# Patient Record
Sex: MALE | Race: BLACK OR AFRICAN AMERICAN | NOT HISPANIC OR LATINO | Employment: FULL TIME | URBAN - METROPOLITAN AREA
[De-identification: names, ages, dates, MRNs, and addresses within clinical notes are randomized per-mention and may not be internally consistent; named-entity substitution may affect disease eponyms.]

---

## 2024-11-22 ENCOUNTER — HOSPITAL ENCOUNTER (OUTPATIENT)
Dept: RADIOLOGY | Facility: HOSPITAL | Age: 44
Discharge: HOME | End: 2024-11-22
Attending: PHYSICIAN ASSISTANT
Payer: COMMERCIAL

## 2024-11-22 ENCOUNTER — OCHSNER VIRTUAL EMERGENCY DEPARTMENT (OUTPATIENT)
Facility: CLINIC | Age: 44
End: 2024-11-22
Payer: COMMERCIAL

## 2024-11-22 ENCOUNTER — NURSE TRIAGE (OUTPATIENT)
Dept: ADMINISTRATIVE | Facility: CLINIC | Age: 44
End: 2024-11-22
Payer: COMMERCIAL

## 2024-11-22 ENCOUNTER — OFFICE VISIT (OUTPATIENT)
Dept: ORTHOPEDICS | Facility: CLINIC | Age: 44
End: 2024-11-22
Payer: COMMERCIAL

## 2024-11-22 VITALS — BODY MASS INDEX: 37 KG/M2 | WEIGHT: 249.81 LBS | HEIGHT: 69 IN

## 2024-11-22 DIAGNOSIS — S76.112A QUADRICEPS STRAIN, LEFT, INITIAL ENCOUNTER: ICD-10-CM

## 2024-11-22 DIAGNOSIS — R25.2 MUSCLE CRAMPING: ICD-10-CM

## 2024-11-22 DIAGNOSIS — M25.562 PAIN IN BOTH KNEES, UNSPECIFIED CHRONICITY: ICD-10-CM

## 2024-11-22 DIAGNOSIS — S86.899A: Primary | ICD-10-CM

## 2024-11-22 DIAGNOSIS — M25.561 PAIN IN BOTH KNEES, UNSPECIFIED CHRONICITY: ICD-10-CM

## 2024-11-22 PROCEDURE — 73562 X-RAY EXAM OF KNEE 3: CPT | Mod: TC,50

## 2024-11-22 PROCEDURE — 73562 X-RAY EXAM OF KNEE 3: CPT | Mod: 26,50,, | Performed by: RADIOLOGY

## 2024-11-22 PROCEDURE — 99999 PR PBB SHADOW E&M-EST. PATIENT-LVL III: CPT | Mod: PBBFAC,,, | Performed by: PHYSICIAN ASSISTANT

## 2024-11-22 RX ORDER — MELOXICAM 15 MG/1
15 TABLET ORAL DAILY
Qty: 30 TABLET | Refills: 1 | Status: SHIPPED | OUTPATIENT
Start: 2024-11-22

## 2024-11-22 NOTE — TELEPHONE ENCOUNTER
Pt states that he has stress fractures to bilateral knees and is currently being seen by PT. Pt now c/o legs cramping and pain to right thigh 4/10 for two weeks. Pt states that he was seen in UC 2 weeks ago for thigh pain and given steroid shot, NSAID and muscle relaxer pills. Pt does not have a PCP. States that PT is recommending that he is seen for an MRI and Xray. Denies chest pain and difficulty breathing. Advise to be seen in ED/UC  or PCP with approval. Secure chat sent to [facility] Rufus Tilley per protocol. Advised okay to be seen in office within 3 days. Appt scheduled for orthopedics for today per [facility] staff. Pt made aware. VU. Encounter routed to provider.     Reason for Disposition   Thigh or calf pain in only one leg and present > 1 hour    Additional Information   Negative: Looks like a broken bone or dislocated joint (e.g., crooked or deformed)   Negative: Sounds like a life-threatening emergency to the triager   Negative: Chest pain   Negative: Difficulty breathing   Negative: Entire foot is cool or blue in comparison to other side   Negative: Unable to walk   Negative: Fever and red area (or area very tender to touch)   Negative: Swollen joint and fever    Protocols used: Leg Pain-A-OH

## 2024-11-22 NOTE — PROGRESS NOTES
SUBJECTIVE:     Chief Complaint & History of Present Illness:  Joe Culver is a New patient 43 y.o. male who is seen here today with a complaint of    Chief Complaint   Patient presents with    Right Knee - Pain    Left Knee - Pain    .  Patient is here today for evaluation treatment bilateral shin bilateral knees and right thigh pain for the past several months.  States he had suffered a upper respiratory infection while out of the country in [place] was subsequently hospitalized received large doses of steroids.  Upon release he began and attempted rehab programs with strength in his lower extremities and regain his functional capacities.  Through the course of this he developed pain and tenderness in the anterior calves of bilateral lower extremities was seen by an outside facility x-rays and MRI at that time demonstrated multiple stress fractures in the area.  Has tried to decrease his activity levels but is still running on a treadmill on a regular basis at that time.  Begun to develop soreness and pain in the knees from which she saw a different provider placed him on muscle relaxers and NSAIDs with limited relief.  States he did have some alteration in his gait secondary to the knee pain which has resulted in soreness and pain in the mid quadriceps region of the right leg.  States he does occasionally have cramping even met rest.  For in depth questioning patient's diet has been very poor in new treatments in minerals this could be a cramping trigger.  Patient works at a [employer] and must ambulate up to 7-8 miles per day including climbing multiple ladders this is exacerbating his condition  On a scale of 1-10, with 10 being worst pain imaginable, he rates this pain as 4 on good days and 8 on bad days.  he describes the pain as sore and ankle.    Review of patient's allergies indicates:  No Known Allergies      No current outpatient medications on file.     No current facility-administered medications for  "this visit.       No past medical history on file.    No past surgical history on file.    Vital Signs (Most Recent)  There were no vitals filed for this visit.        Review of Systems:  ROS:  Constitutional: no fever or chills  Eyes: no visual changes  ENT: no nasal congestion or sore throat  Respiratory: no cough or shortness of breath  Cardiovascular: no chest pain or palpitations  Gastrointestinal: no nausea or vomiting, tolerating diet  Genitourinary: no hematuria or dysuria  Integument/Breast: no rash or pruritis  Hematologic/Lymphatic: no easy bruising or lymphadenopathy  Musculoskeletal: no arthralgias or myalgias  Neurological: no seizures or tremors  Behavioral/Psych: no auditory or visual hallucinations  Endocrine: no heat or cold intolerance                OBJECTIVE:     PHYSICAL EXAM:  Height: 5' 9" (175.3 cm) Weight: 113.3 kg (249 lb 12.5 oz), General Appearance: Well nourished, well developed, in no acute distress.  Neurological: Mood & affect are normal.    left  Knee Exam:  Knee Range of Motion:0-125 degrees flexion   Effusion:none  Condition of skin:intact  Location of tenderness:superior anterior tibia   Strength:5 of 5  Stability:  Lachman: stable, LCL: stable, MCL: stable, PCL: stable, and posteromedial (dial): stable  Varus /Valgus stress:  normal  [place]:   negative/negative    right  Knee Exam:  Knee Range of Motion:0-125 degrees flexion   Effusion:none  Condition of skin:intact  Location of tenderness:  Superior anterior tibia and medial quadriceps body   Strength:limited by pain and 5 of 5  Stability:  Lachman: stable, LCL: stable, MCL: stable, PCL: stable, and posteromedial (dial): stable  Varus /Valgus stress:  normal  [place]:   negative/negative      Hip Examination:  positives: pain with flexion and negatives: FROM    RADIOGRAPHS:  X-rays of the knees taken today films reviewed by me demonstrate well-preserved joint spaces throughout both knees with no evidence of fracture " dislocation or bony abnormalities no marked joint loss in either compartment    ASSESSMENT/PLAN:       ICD-10-CM ICD-9-CM   1. Posterior shin splints, initial encounter  S86.899A 844.9   2. Pain in both knees, unspecified chronicity  M25.561 719.46    M25.562    3. Muscle cramping  R25.2 729.82   4. Quadriceps strain, left, initial encounter  S76.112A 843.8       Plan: We discussed with the patient at length all the different treatment options available for  the knee including anti-inflammatories, acetaminophen, rest, ice, knee strengthening exercise, occasional cortisone injections for temporary relief, Viscosupplimentation injections, arthroscopic debridement osteotomy, and finally knee arthroplasty.   Meloxicam 15 mg q.d. with food x2 weeks followed by p.r.n.  Consult to Physical therapy for sports training and range of motion exercises  Follow-up in 3 weeks if symptoms not significantly improved sooner symptoms dictate

## 2024-11-22 NOTE — PLAN OF CARE-OVED
[facility] (formerly [facility]3] Emergency Department Plan of Care Note    Referral source: Nurse On-Call      Reason for consult: Joint Pain(s)      Additional History: Good morning, Pt states that he has stress fractures to bilateral knees and is currently being seen by PT. Pt now c/o legs cramping and pain to right thigh 4/10 for two weeks. Pt states that he was seen in  2 weeks ago for thigh pain and given steroid shot, NSAID and muscle relaxer pills. Pt does not have a PCP. States that PT is recommending that he is seen for an MRI and Xray.       Disposition recommended: Specialist Referral:  Ortho      Patient navigator was able to obtain a appointment with Orthopedics this afternoon

## 2024-11-26 ENCOUNTER — CLINICAL SUPPORT (OUTPATIENT)
Dept: REHABILITATION | Facility: HOSPITAL | Age: 44
End: 2024-11-26
Attending: PHYSICIAN ASSISTANT
Payer: COMMERCIAL

## 2024-11-26 DIAGNOSIS — S86.899A: ICD-10-CM

## 2024-11-26 DIAGNOSIS — M25.562 PAIN IN BOTH KNEES, UNSPECIFIED CHRONICITY: ICD-10-CM

## 2024-11-26 DIAGNOSIS — M25.562 CHRONIC PAIN OF BOTH KNEES: Primary | ICD-10-CM

## 2024-11-26 DIAGNOSIS — M25.561 CHRONIC PAIN OF BOTH KNEES: Primary | ICD-10-CM

## 2024-11-26 DIAGNOSIS — M25.561 PAIN IN BOTH KNEES, UNSPECIFIED CHRONICITY: ICD-10-CM

## 2024-11-26 DIAGNOSIS — R25.2 MUSCLE CRAMPING: ICD-10-CM

## 2024-11-26 DIAGNOSIS — G89.29 CHRONIC PAIN OF BOTH KNEES: Primary | ICD-10-CM

## 2024-11-26 DIAGNOSIS — R26.9 GAIT ABNORMALITY: ICD-10-CM

## 2024-11-26 PROCEDURE — 97161 PT EVAL LOW COMPLEX 20 MIN: CPT | Performed by: PHYSICAL THERAPIST

## 2024-11-26 PROCEDURE — 97110 THERAPEUTIC EXERCISES: CPT | Performed by: PHYSICAL THERAPIST

## 2024-11-26 NOTE — PROGRESS NOTES
[facility]  Physical Therapy Initial Evaluation    Name: Joe Culver  Clinic Number: [id number]    Therapy Diagnosis:   Encounter Diagnoses   Name Primary?    Pain in both knees, unspecified chronicity     Posterior shin splints, initial encounter     Muscle cramping     Chronic pain of both knees Yes    Gait abnormality      Physician: Luther Mcgee PA*    Physician Orders: PT Eval and Treat  Medical Diagnosis from Referral:   M25.561,M25.562 (ICD-10-CM) - Pain in both knees, unspecified chronicity   S86.899A (ICD-10-CM) - Posterior shin splints, initial encounter   R25.2 (ICD-10-CM) - Muscle cramping   Evaluation Date: 11/26/2024  Authorization Period Expiration: 11/22/2025  Plan of Care Expiration: 02/26/2025  Visit # / Visits authorized: 1/1    FOTO: 34  FOTO 1st Follow-up: NA  FOTO 2nd Follow-up: NA    Time In: 1600  Time Out: 1705  Total Billable Time: 65 minutes    Precautions: Standard and Hx of Stress B tibial crest    Subjective     Date of onset: March 2024  History of current condition - Palomo reports: aching pain near B patellar T that presents with prolonged walking/wb'ing. Issues with loaded knee flexion and navigating stairs. Unable to relate any true recent PIERRE, but describes that he attempted to go to [facility] 5-6 days/week January-March and ended up developing stress fractures in his knees. Also describes that he returned to work in June after a period of rest, and also attempted PT in June.  Denies any mechanical sx behavior. Denies any giving way.    MD/PA Hx 11/22/2024:  States he had suffered a upper respiratory infection while out of the country in March was subsequently hospitalized received large doses of steroids. Upon release he began and attempted rehab programs with strength in his lower extremities and regain his functional capacities. Through the course of this he developed pain and tenderness in the anterior calves of bilateral lower extremities was  "seen by an outside facility x-rays and MRI at that time demonstrated multiple stress fractures in the area. Has tried to decrease his activity levels but is still running on a treadmill on a regular basis at that time. Begun to develop soreness and pain in the knees from which she saw a different provider placed him on muscle relaxers and NSAIDs with limited relief. States he did have some alteration in his gait secondary to the knee pain which has resulted in soreness and pain in the mid quadriceps region of the right leg. States he does occasionally have cramping even met rest. For in depth questioning patient's diet has been very poor in new treatments in minerals this could be a cramping trigger. 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    Surgical History:   Joe Culver  has no past surgical history on file.    Medications:   Joe has a current medication list which includes the following prescription(s): meloxicam.    Allergies:   Review of patient's allergies indicates:  No Known Allergies     Objective     Functional Tests:   SLS EO:   R: 8" L: 10"  Double leg squat: decreased depth; ANT chain dominant  Single leg squat: unable B  Single leg calf raise:   R: 10 L: 8  30" " STS: 6 reps    ROM  (Hyper - Ext - Flex) Right Left   Passive 0 - 0 - 115 0 - 0 - 120   Active  0 - 0 - 95* 0 - 0 - 100*   * = Pain    Lower Extremity Strength   Right LE Left LE   Quadriceps: 3+/5 3+/5   Hamstrings: 4/5 4/5   Hip flexion (supine): 3+/5 3+/5   Hip extension:  3+/5 3+/5   PGM: 3+/5 3+/5     Joint Mobility: hypo B patella     Palpation: TTP B patellar T    Flexibility:    Hamstrings: R = tigh ; L = tight    Emil test: R = tight ; L = tight    Intake Outcome Measure for FOTO Knee Survey    Therapist reviewed FOTO scores for Joe Culver on 11/26/2024.   FOTO documents entered into [employer] - see Media section.    Intake Score: 34%     Treatment     Treatment Time In: 1600  Treatment Time Out: 1705  Total Treatment time separate from Evaluation: 40 minutes    Palomo received the treatments listed below:      Therapeutic exercises to develop strength, endurance, ROM, flexibility, posture, and core stabilization for 30 minutes including:  Access Code: [email or web address]  URL: [email or web address]  Date: 11/26/2024  Prepared by: Maksim Concepcion    Exercises  - Supine Figure 4 Piriformis Stretch  - 1 x daily - 7 x weekly - 3 sets - 10 reps  - Supine Hamstring Stretch  - 1 x daily - 7 x weekly - 3 sets - 10 reps  - Side Stepping with Resistance at Thighs  - 1 x daily - 7 x weekly - 3 sets - 10 reps  - Standing Hip Abduction with Resistance at Ankles and Counter Support  - 1 x daily - 7 x weekly - 3 sets - 10 reps  - Standing Hip Extension with Resistance at Ankles and Counter Support  - 1 x daily - 7 x weekly - 3 sets - 10 reps    Neuromuscular re-education activities to improve: Balance, Coordination, Kinesthetic, Sense, Proprioception, and Posture for 0 minutes. The following activities were included:     Therapeutic activities to improve functional performance for 0 minutes, including:    Home Exercises and Patient Education Provided     Education provided:   - Hip mobility and stability  - Prognosis, activity  modification, goals for therapy, role of therapy for care, exercises/HEP    Written Home Exercises Provided:  Exercises were reviewed and Palomo was able to demonstrate them prior to the end of the session.   Pt received a written copy of exercises to perform at home. Palomo demonstrated good understanding of the education provided.     See EMR under patient instructions for exercises given.     Assessment     Joe is a 43 y.o. male referred to outpatient Physical Therapy with B Patellar Tendinopathy; Hx of stress fractures to B knees. Current deficits include decreased HS and overall hip mobility, weakness B hips, and stiffness B knees. Deficits contributing to issues with prolonged standing, walking, squatting, and ADL performance.    Pt will benefit from skilled outpatient Physical Therapy to address the deficits stated above and in the chart below, provide pt/family education, and to maximize pt's level of independence. Pt prognosis is Good.     Plan of care discussed with patient: Yes  Pt's spiritual, cultural and educational needs considered and patient is agreeable to the plan of care and goals as stated below:     Anticipated Barriers for therapy: work demands; Hx of stress fractures    Medical Necessity is demonstrated by the following:    History  Co-morbidities and personal factors that may impact the plan of care [x] LOW: no personal factors / co-morbidities  [] MODERATE: 1-2 personal factors / co-morbidities  [] HIGH: 3+ personal factors / co-morbidities    Moderate / High Support Documentation:   Co-morbidities affecting plan of care: None    Personal Factors:   No deficits     Examination  Body Structures and Functions, activity limitations and participation restrictions that may impact the plan of care [x] LOW: addressing 1-2 elements  [] MODERATE: 3+ elements  [] HIGH: 4+ elements (please support below)    Moderate / High Support Documentation: None     Clinical Presentation [x] LOW: stable  []  MODERATE: Evolving  [] HIGH: Unstable     Decision Making/ Complexity Score: low       GOALS   Short Term Goals: 6 weeks  Pt will report decreased B knee pain  < / =  4/10  to increase tolerance for ADL performance and recreational routine.  Pt will improve B knee ROM to WFL in order to be able to perform ADLs without difficulty.  Pt will improve B knee strength by 1/3 MMT grade to increase tolerance for ADL and work activities  Pt will demonstrate tolerance to HEP to improve independence with ADL's    Long Term Goals: 12 weeks  Pt will report decreased B knee pain  < / =  2/10  to increase tolerance for ADL performance and recreational routine  Pt will improve B knee ROM to WNL in order to be able to perform ADLs without difficulty  Pt will improve B knee strength by 1/3 MMT grade to increase tolerance for ADL and work activities  Pt will report at CJ level (20-40% impaired) on FOTO Knee to demonstrate increase in LE function with every day tasks.     Plan   Plan of care Certification: 11/26/2024 to 02/26/2025.    Outpatient Physical Therapy 2 times weekly for 12 weeks to include the following interventions: Gait Training, Manual Therapy, Moist Heat/ Ice, Neuromuscular Re-ed, Patient Education, Therapeutic Activites, Therapeutic Exercise, and Functional Dry Needling with/or without Electrical Stimulation as needed.     Sharath Concepcion, PT, DPT , DPT, OCS  Board Certified in Orthopedic Physical Therapy

## 2024-11-29 PROBLEM — R26.9 GAIT ABNORMALITY: Status: ACTIVE | Noted: 2024-11-29

## 2024-11-29 NOTE — PLAN OF CARE
[facility]  Physical Therapy Initial Evaluation    Name: Joe Culver  Clinic Number: [id number]    Therapy Diagnosis:   Encounter Diagnoses   Name Primary?    Pain in both knees, unspecified chronicity     Posterior shin splints, initial encounter     Muscle cramping     Chronic pain of both knees Yes    Gait abnormality      Physician: Luther Mcgee PA*    Physician Orders: PT Eval and Treat  Medical Diagnosis from Referral:   M25.561,M25.562 (ICD-10-CM) - Pain in both knees, unspecified chronicity   S86.899A (ICD-10-CM) - Posterior shin splints, initial encounter   R25.2 (ICD-10-CM) - Muscle cramping   Evaluation Date: 11/26/2024  Authorization Period Expiration: 11/22/2025  Plan of Care Expiration: 02/26/2025  Visit # / Visits authorized: 1/1    FOTO: 34  FOTO 1st Follow-up: NA  FOTO 2nd Follow-up: NA    Time In: 1600  Time Out: 1705  Total Billable Time: 65 minutes    Precautions: Standard and Hx of Stress B tibial crest    Subjective     Date of onset: March 2024  History of current condition - Palomo reports: aching pain near B patellar T that presents with prolonged walking/wb'ing. Issues with loaded knee flexion and navigating stairs. Unable to relate any true recent PIERRE, but describes that he attempted to go to [facility] 5-6 days/week January-March and ended up developing stress fractures in his knees. Also describes that he returned to work in June after a period of rest, and also attempted PT in June.  Denies any mechanical sx behavior. Denies any giving way.    MD/PA Hx 11/22/2024:  States he had suffered a upper respiratory infection while [place] of the country in [place] was subsequently hospitalized received large doses of steroids. Upon release he began and attempted rehab programs with strength in his lower extremities and regain his functional capacities. Through the course of this he developed pain and tenderness in the anterior calves of bilateral lower extremities was  "seen by an outside facility x-rays and MRI at that time demonstrated multiple stress fractures in the area. Has tried to decrease his activity levels but is still running on a treadmill on a regular basis at that time. Begun to develop soreness and pain in the knees from which she saw a different provider placed him on muscle relaxers and NSAIDs with limited relief. States he did have some alteration in his gait secondary to the knee pain which has resulted in soreness and pain in the mid quadriceps region of the right leg. States he does occasionally have cramping even met rest. For in depth questioning patient's diet has been very poor in new treatments in minerals this could be a cramping trigger. 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    Surgical History:   Joe Culver  has no past surgical history on file.    Medications:   Joe has a current medication list which includes the following prescription(s): meloxicam.    Allergies:   Review of patient's allergies indicates:  No Known Allergies     Objective     Functional Tests:   SLS EO:   R: 8" L: 10"  Double leg squat: decreased depth; ANT chain dominant  Single leg squat: unable B  Single leg calf raise:   R: 10 L: 8  30" " STS: 6 reps    ROM  (Hyper - Ext - Flex) Right Left   Passive 0 - 0 - 115 0 - 0 - 120   Active  0 - 0 - 95* 0 - 0 - 100*   * = Pain    Lower Extremity Strength   Right LE Left LE   Quadriceps: 3+/5 3+/5   Hamstrings: 4/5 4/5   Hip flexion (supine): 3+/5 3+/5   Hip extension:  3+/5 3+/5   PGM: 3+/5 3+/5     Joint Mobility: hypo B patella     Palpation: TTP B patellar T    Flexibility:    Hamstrings: R = tigh ; L = tight    Emil test: R = tight ; L = tight    Intake Outcome Measure for FOTO Knee Survey    Therapist reviewed FOTO scores for Joe Culver on 11/26/2024.   FOTO documents entered into [employer] - see Media section.    Intake Score: 34%     Treatment     Treatment Time In: 1600  Treatment Time Out: 1705  Total Treatment time separate from Evaluation: 40 minutes    Palomo received the treatments listed below:      Therapeutic exercises to develop strength, endurance, ROM, flexibility, posture, and core stabilization for 30 minutes including:  Access Code: [email or web address]  URL: [email or web address]  Date: 11/26/2024  Prepared by: Maksim Concepcion    Exercises  - Supine Figure 4 Piriformis Stretch  - 1 x daily - 7 x weekly - 3 sets - 10 reps  - Supine Hamstring Stretch  - 1 x daily - 7 x weekly - 3 sets - 10 reps  - Side Stepping with Resistance at Thighs  - 1 x daily - 7 x weekly - 3 sets - 10 reps  - Standing Hip Abduction with Resistance at Ankles and Counter Support  - 1 x daily - 7 x weekly - 3 sets - 10 reps  - Standing Hip Extension with Resistance at Ankles and Counter Support  - 1 x daily - 7 x weekly - 3 sets - 10 reps    Neuromuscular re-education activities to improve: Balance, Coordination, Kinesthetic, Sense, Proprioception, and Posture for 0 minutes. The following activities were included:     Therapeutic activities to improve functional performance for 0 minutes, including:    Home Exercises and Patient Education Provided     Education provided:   - Hip mobility and stability  - Prognosis, activity  modification, goals for therapy, role of therapy for care, exercises/HEP    Written Home Exercises Provided:  Exercises were reviewed and Palomo was able to demonstrate them prior to the end of the session.   Pt received a written copy of exercises to perform at home. Palomo demonstrated good understanding of the education provided.     See EMR under patient instructions for exercises given.     Assessment     Joe is a 43 y.o. male referred to outpatient Physical Therapy with B Patellar Tendinopathy; Hx of stress fractures to B knees. Current deficits include decreased HS and overall hip mobility, weakness B hips, and stiffness B knees. Deficits contributing to issues with prolonged standing, walking, squatting, and ADL performance.    Pt will benefit from skilled outpatient Physical Therapy to address the deficits stated above and in the chart below, provide pt/family education, and to maximize pt's level of independence. Pt prognosis is Good.     Plan of care discussed with patient: Yes  Pt's spiritual, cultural and educational needs considered and patient is agreeable to the plan of care and goals as stated below:     Anticipated Barriers for therapy: work demands; Hx of stress fractures    Medical Necessity is demonstrated by the following:    History  Co-morbidities and personal factors that may impact the plan of care [x] LOW: no personal factors / co-morbidities  [] MODERATE: 1-2 personal factors / co-morbidities  [] HIGH: 3+ personal factors / co-morbidities    Moderate / High Support Documentation:   Co-morbidities affecting plan of care: None    Personal Factors:   No deficits     Examination  Body Structures and Functions, activity limitations and participation restrictions that may impact the plan of care [x] LOW: addressing 1-2 elements  [] MODERATE: 3+ elements  [] HIGH: 4+ elements (please support below)    Moderate / High Support Documentation: None     Clinical Presentation [x] LOW: stable  []  MODERATE: Evolving  [] HIGH: Unstable     Decision Making/ Complexity Score: low       GOALS   Short Term Goals: 6 weeks  Pt will report decreased B knee pain  < / =  4/10  to increase tolerance for ADL performance and recreational routine.  Pt will improve B knee ROM to WFL in order to be able to perform ADLs without difficulty.  Pt will improve B knee strength by 1/3 MMT grade to increase tolerance for ADL and work activities  Pt will demonstrate tolerance to HEP to improve independence with ADL's    Long Term Goals: 12 weeks  Pt will report decreased B knee pain  < / =  2/10  to increase tolerance for ADL performance and recreational routine  Pt will improve B knee ROM to WNL in order to be able to perform ADLs without difficulty  Pt will improve B knee strength by 1/3 MMT grade to increase tolerance for ADL and work activities  Pt will report at CJ level (20-40% impaired) on FOTO Knee to demonstrate increase in LE function with every day tasks.     Plan   Plan of care Certification: 11/26/2024 to 02/26/2025.    Outpatient Physical Therapy 2 times weekly for 12 weeks to include the following interventions: Gait Training, Manual Therapy, Moist Heat/ Ice, Neuromuscular Re-ed, Patient Education, Therapeutic Activites, Therapeutic Exercise, and Functional Dry Needling with/or without Electrical Stimulation as needed.     Sharath Concepcion, PT, DPT , DPT, OCS  Board Certified in Orthopedic Physical Therapy

## 2024-12-03 ENCOUNTER — CLINICAL SUPPORT (OUTPATIENT)
Dept: REHABILITATION | Facility: HOSPITAL | Age: 44
End: 2024-12-03
Payer: COMMERCIAL

## 2024-12-03 DIAGNOSIS — R26.9 GAIT ABNORMALITY: Primary | ICD-10-CM

## 2024-12-03 PROCEDURE — 97530 THERAPEUTIC ACTIVITIES: CPT | Performed by: PHYSICAL THERAPIST

## 2024-12-03 PROCEDURE — 97112 NEUROMUSCULAR REEDUCATION: CPT | Performed by: PHYSICAL THERAPIST

## 2024-12-05 NOTE — PROGRESS NOTES
Physical Therapy Daily Treatment Note     Name: Joe Culver  Clinic Number: [id number]    Therapy Diagnosis:   Encounter Diagnosis   Name Primary?    Gait abnormality Yes     Physician: Luther Mcgee PA*    Visit Date: 12/3/2024    Physician Orders: PT Eval and Treat  Medical Diagnosis from Referral:   M25.561,M25.562 (ICD-10-CM) - Pain in both knees, unspecified chronicity   S86.899A (ICD-10-CM) - Posterior shin splints, initial encounter   R25.2 (ICD-10-CM) - Muscle cramping   Evaluation Date: 11/26/2024  Authorization Period Expiration: 11/22/2025  Plan of Care Expiration: 02/26/2025  Visit # / Visits authorized: 1/10 (+ EVAL)     FOTO: 34  FOTO 1st Follow-up: NA  FOTO 2nd Follow-up: NA     Time In: 1610  Time Out: 1710  Total Billable Time: 60 minutes     Precautions: Standard and Hx of Stress B tibial crest    Subjective     Pt reports mild soreness after last session, but no adverse pain.  He was compliant with home exercise program.  Response to previous treatment: Good  Functional change: Improved walking tolerance    Pain: 3/10  Location: bilateral knee      Objective     Objective Measures updated at progress report unless specified    Treatment     PT Tech Extender utilized under supervision throughout tx session  Access Code: [email or web address]  Palomo received the treatments listed below:       Therapeutic exercises to develop strength, endurance, ROM, flexibility, posture, and core stabilization for 0 minutes including:  NP:  - Supine Figure 4 Piriformis Stretch  - 1 x daily - 7 x weekly - 3 sets - 10 reps  - Supine Hamstring Stretch  - 1 x daily - 7 x weekly - 3 sets - 10 reps  - Side Stepping with Resistance at Thighs  - 1 x daily - 7 x weekly - 3 sets - 10 reps  - Standing Hip Abduction with Resistance at Ankles and Counter Support  - 1 x daily - 7 x weekly - 3 sets - 10 reps  - Standing Hip Extension with Resistance at Ankles and Counter Support  - 1 x daily - 7 x weekly - 3 sets - 10 reps     Neuromuscular  re-education activities to improve: Balance, Coordination, Kinesthetic, Sense, Proprioception, and Posture for 30 minutes. The following activities were included:   Bridging GTB 3 x 15  Fire Hydrants GTB 3 x 15  SL Clams GTB 3 x 15     Therapeutic activities to improve functional performance for 30 minutes, including:  Bike completed for 10' to increase ROM, endurance, and decrease pain to improve tolerance to ADLs and age-related activities  Monster Walks GTB x 10 laps  Standing Hip Diagonals GTB 3 x 15     Home Exercises and Patient Education Provided      Education provided:   - Hip mobility and stability  - Prognosis, activity modification, goals for therapy, role of therapy for care, exercises/HEP     Written Home Exercises Provided:  Exercises were reviewed and Palomo was able to demonstrate them prior to the end of the session.   Pt received a written copy of exercises to perform at home. Palomo demonstrated good understanding of the education provided.      See EMR under patient instructions for exercises given.     Assessment     First return since original session. Able to progress CKC interventions to include more hip strengthening without being limited by knee sx behavior.  Palomo Is progressing well towards his goals.   Pt prognosis is Good.     Pt will continue to benefit from skilled outpatient physical therapy to address the deficits listed in the problem list box on initial evaluation, provide pt/family education and to maximize pt's level of independence in the home and community environment.     Pt's spiritual, cultural and educational needs considered and pt agreeable to plan of care and goals.    Anticipated barriers to physical therapy: work demands; Hx of stress fractures     GOALS   Short Term Goals: 6 weeks  Pt will report decreased B knee pain  < / =  4/10  to increase tolerance for ADL performance and recreational routine.  Pt will improve B knee ROM to WFL in order to be able to perform ADLs  without difficulty.  Pt will improve B knee strength by 1/3 MMT grade to increase tolerance for ADL and work activities  Pt will demonstrate tolerance to HEP to improve independence with ADL's     Long Term Goals: 12 weeks  Pt will report decreased B knee pain  < / =  2/10  to increase tolerance for ADL performance and recreational routine  Pt will improve B knee ROM to WNL in order to be able to perform ADLs without difficulty  Pt will improve B knee strength by 1/3 MMT grade to increase tolerance for ADL and work activities  Pt will report at CJ level (20-40% impaired) on FOTO Knee to demonstrate increase in LE function with every day tasks.     Plan     Continue per ANN Concepcion, PT, DPT, DPT  Board Certified in Orthopedic Physical Therapy

## 2024-12-10 ENCOUNTER — CLINICAL SUPPORT (OUTPATIENT)
Dept: REHABILITATION | Facility: HOSPITAL | Age: 44
End: 2024-12-10
Attending: PHYSICAL THERAPIST
Payer: COMMERCIAL

## 2024-12-10 DIAGNOSIS — R26.9 GAIT ABNORMALITY: Primary | ICD-10-CM

## 2024-12-10 PROCEDURE — 97530 THERAPEUTIC ACTIVITIES: CPT | Performed by: PHYSICAL THERAPIST

## 2024-12-10 PROCEDURE — 97112 NEUROMUSCULAR REEDUCATION: CPT | Performed by: PHYSICAL THERAPIST

## 2024-12-13 ENCOUNTER — TELEPHONE (OUTPATIENT)
Dept: ORTHOPEDICS | Facility: CLINIC | Age: 44
End: 2024-12-13
Payer: COMMERCIAL

## 2024-12-13 NOTE — TELEPHONE ENCOUNTER
----- Message from Danilo sent at 12/13/2024  1:00 PM CST -----  Regarding: Appt  Contact: pt [phone]  Pt is returning call to discuss appt for today, pt was informed provider would try and fit in schedule, please call pt @[phone]

## 2024-12-13 NOTE — TELEPHONE ENCOUNTER
----- Message from Melanie sent at 12/13/2024  9:34 AM CST -----  Regarding: Reschedule  Contact: pt [phone]  Type:  Needs Medical Advice    Who Called: Joe calling because he has caught a flat and calling to see if he can get a later appt     Would the patient rather a call back or a response via [facility]? Call back  Best Call Back Number: pt [phone]   Additional Information:

## 2024-12-15 NOTE — PROGRESS NOTES
Physical Therapy Daily Treatment Note     Name: Joe Culver  Clinic Number: [id number]    Therapy Diagnosis:   Encounter Diagnosis   Name Primary?    Gait abnormality Yes     Physician: Luther Mcgee PA*    Visit Date: 12/10/2024    Physician Orders: PT Eval and Treat  Medical Diagnosis from Referral:   M25.561,M25.562 (ICD-10-CM) - Pain in both knees, unspecified chronicity   S86.899A (ICD-10-CM) - Posterior shin splints, initial encounter   R25.2 (ICD-10-CM) - Muscle cramping   Evaluation Date: 11/26/2024  Authorization Period Expiration: 11/22/2025  Plan of Care Expiration: 02/26/2025  Visit # / Visits authorized: 2/10 (+ EVAL)     FOTO: 34  FOTO 1st Follow-up: NA  FOTO 2nd Follow-up: NA     Time In: 1510  Time Out: 1610  Total Billable Time: 60 minutes     Precautions: Standard and Hx of Stress B tibial crest    Subjective     Pt reports knees feel around the same, but no increased pain.  He was compliant with home exercise program.  Response to previous treatment: Good  Functional change: Improved walking tolerance    Pain: 3/10  Location: bilateral knee      Objective     Objective Measures updated at progress report unless specified    Treatment     PT Tech Extender utilized under supervision throughout tx session  Access Code: [email or web address]  Palomo received the treatments listed below:       Therapeutic exercises to develop strength, endurance, ROM, flexibility, posture, and core stabilization for 0 minutes including:  NP:  - Supine Figure 4 Piriformis Stretch  - 1 x daily - 7 x weekly - 3 sets - 10 reps  - Supine Hamstring Stretch  - 1 x daily - 7 x weekly - 3 sets - 10 reps  - Side Stepping with Resistance at Thighs  - 1 x daily - 7 x weekly - 3 sets - 10 reps  - Standing Hip Abduction with Resistance at Ankles and Counter Support  - 1 x daily - 7 x weekly - 3 sets - 10 reps  - Standing Hip Extension with Resistance at Ankles and Counter Support  - 1 x daily - 7 x weekly - 3 sets - 10 reps     Neuromuscular  re-education activities to improve: Balance, Coordination, Kinesthetic, Sense, Proprioception, and Posture for 30 minutes. The following activities were included:   Bridging GTB 3 x 15  Fire Hydrants GTB 3 x 15  SL Clams GTB 3 x 15     Therapeutic activities to improve functional performance for 30 minutes, including:  Bike completed for 10' to increase ROM, endurance, and decrease pain to improve tolerance to ADLs and age-related activities  Monster Walks GTB x 10 laps  Standing Hip Diagonals GTB 3 x 15     Home Exercises and Patient Education Provided      Education provided:   - Hip mobility and stability  - Prognosis, activity modification, goals for therapy, role of therapy for care, exercises/HEP     Written Home Exercises Provided:  Exercises were reviewed and Palomo was able to demonstrate them prior to the end of the session.   Pt received a written copy of exercises to perform at home. Palomo demonstrated good understanding of the education provided.      See EMR under patient instructions for exercises given.     Assessment     Continued to work in more proximal hip strengthening in CKC demands. Pt inquired about transitioning to performance training for future care to work on strength and loading. Will acquire contact information and send to pt through the portal.  Palomo Is progressing well towards his goals.   Pt prognosis is Good.     Pt will continue to benefit from skilled outpatient physical therapy to address the deficits listed in the problem list box on initial evaluation, provide pt/family education and to maximize pt's level of independence in the home and community environment.     Pt's spiritual, cultural and educational needs considered and pt agreeable to plan of care and goals.    Anticipated barriers to physical therapy: work demands; Hx of stress fractures     GOALS   Short Term Goals: 6 weeks  Pt will report decreased B knee pain  < / =  4/10  to increase tolerance for ADL performance and  recreational routine.  Pt will improve B knee ROM to WFL in order to be able to perform ADLs without difficulty.  Pt will improve B knee strength by 1/3 MMT grade to increase tolerance for ADL and work activities  Pt will demonstrate tolerance to HEP to improve independence with ADL's     Long Term Goals: 12 weeks  Pt will report decreased B knee pain  < / =  2/10  to increase tolerance for ADL performance and recreational routine  Pt will improve B knee ROM to WNL in order to be able to perform ADLs without difficulty  Pt will improve B knee strength by 1/3 MMT grade to increase tolerance for ADL and work activities  Pt will report at CJ level (20-40% impaired) on FOTO Knee to demonstrate increase in LE function with every day tasks.     Plan     Continue per ANN Concepcion, PT, DPT, DPT  Board Certified in Orthopedic Physical Therapy

## 2024-12-17 ENCOUNTER — PATIENT MESSAGE (OUTPATIENT)
Dept: REHABILITATION | Facility: HOSPITAL | Age: 44
End: 2024-12-17

## 2025-01-14 ENCOUNTER — CLINICAL SUPPORT (OUTPATIENT)
Dept: REHABILITATION | Facility: HOSPITAL | Age: 45
End: 2025-01-14
Attending: PHYSICAL THERAPIST
Payer: COMMERCIAL

## 2025-01-14 DIAGNOSIS — R26.9 GAIT ABNORMALITY: Primary | ICD-10-CM

## 2025-01-14 PROCEDURE — 97530 THERAPEUTIC ACTIVITIES: CPT | Performed by: PHYSICAL THERAPIST

## 2025-01-15 NOTE — PROGRESS NOTES
Physical Therapy Daily Treatment Note     Name: Joe Culver  Clinic Number: [id number]    Therapy Diagnosis:   Encounter Diagnosis   Name Primary?    Gait abnormality Yes     Physician: Luther Mcgee PA*    Visit Date: 1/14/2025    Physician Orders: PT Eval and Treat  Medical Diagnosis from Referral:   M25.561,M25.562 (ICD-10-CM) - Pain in both knees, unspecified chronicity   S86.899A (ICD-10-CM) - Posterior shin splints, initial encounter   R25.2 (ICD-10-CM) - Muscle cramping   Evaluation Date: 11/26/2024  Authorization Period Expiration: 12/31/2025  Plan of Care Expiration: 02/26/2025  Visit # / Visits authorized: 1/10     FOTO: 34  FOTO 1st Follow-up: NA  FOTO 2nd Follow-up: NA     Time In: 1510  Time Out: 1615  Total Billable Time: 65 minutes     Precautions: Standard and Hx of Stress B tibial crest    Subjective     Pt reports still getting sore during work shifts, but has reduced shift time to 10 hours.  He was compliant with home exercise program.  Response to previous treatment: Good  Functional change: Improved walking tolerance    Pain: 3/10  Location: bilateral knee      Objective     Objective Measures updated at progress report unless specified    Treatment     PT Tech Extender utilized under supervision throughout tx session  Access Code: [email or web address]  Palomo received the treatments listed below:       Neuromuscular re-education activities to improve: Balance, Coordination, Kinesthetic, Sense, Proprioception, and Posture for 0 minutes. The following activities were included:   NP:  Bridging GTB 3 x 15  Fire Hydrants GTB 3 x 15  SL Clams GTB 3 x 15     Therapeutic activities to improve functional performance for 65 minutes, including:  Bike completed for 10' to increase ROM, endurance, and decrease pain to improve tolerance to ADLs and age-related activities  Monster Walks GTB x 10 laps  Squat to Bench Holds 20# Dbs 4 x 10  SL Kickstand RDL 20# Db 4 x 10  Shuttle SL 2 bands 4 x 10    NP:  Standing Hip  Diagonals GTB 3 x 15     Home Exercises and Patient Education Provided      Education provided:   - Hip mobility and stability  - Prognosis, activity modification, goals for therapy, role of therapy for care, exercises/HEP     Written Home Exercises Provided:  Exercises were reviewed and Palomo was able to demonstrate them prior to the end of the session.   Pt received a written copy of exercises to perform at home. Palomo demonstrated good understanding of the education provided.      See EMR under patient instructions for exercises given.     Assessment     Palomo with his first return visit in a few weeks. Overall, reports improvement in B knee pain sx behavior. Functionally, continues to demonstrate good form with increased compound movement demands. Discussed transition to self-care outside of the gym; offered OPT for personal training needs. Will have return visit in 2 weeks and assess response to loading. Consider D/C to self-care or intermittent f/u's.  Palomo Is progressing well towards his goals.   Pt prognosis is Good.     Pt will continue to benefit from skilled outpatient physical therapy to address the deficits listed in the problem list box on initial evaluation, provide pt/family education and to maximize pt's level of independence in the home and community environment.     Pt's spiritual, cultural and educational needs considered and pt agreeable to plan of care and goals.    Anticipated barriers to physical therapy: work demands; Hx of stress fractures     GOALS   Short Term Goals: 6 weeks  Pt will report decreased B knee pain  < / =  4/10  to increase tolerance for ADL performance and recreational routine.  Pt will improve B knee ROM to WFL in order to be able to perform ADLs without difficulty.  Pt will improve B knee strength by 1/3 MMT grade to increase tolerance for ADL and work activities  Pt will demonstrate tolerance to HEP to improve independence with ADL's     Long Term Goals: 12 weeks  Pt will  report decreased B knee pain  < / =  2/10  to increase tolerance for ADL performance and recreational routine  Pt will improve B knee ROM to WNL in order to be able to perform ADLs without difficulty  Pt will improve B knee strength by 1/3 MMT grade to increase tolerance for ADL and work activities  Pt will report at CJ level (20-40% impaired) on FOTO Knee to demonstrate increase in LE function with every day tasks.     Plan     Continue per POC    Sharath Concepcion, PT, DPT

## 2025-01-28 ENCOUNTER — CLINICAL SUPPORT (OUTPATIENT)
Dept: REHABILITATION | Facility: HOSPITAL | Age: 45
End: 2025-01-28
Attending: PHYSICAL THERAPIST
Payer: COMMERCIAL

## 2025-01-28 DIAGNOSIS — R26.9 GAIT ABNORMALITY: Primary | ICD-10-CM

## 2025-01-28 PROCEDURE — 97530 THERAPEUTIC ACTIVITIES: CPT

## 2025-01-28 NOTE — PROGRESS NOTES
Physical Therapy Daily Treatment Note     Name: Joe Culver  Clinic Number: [id number]    Therapy Diagnosis:   Encounter Diagnosis   Name Primary?    Gait abnormality Yes     Physician: Luther Mcgee PA*    Visit Date: 1/28/2025    Physician Orders: PT Eval and Treat  Medical Diagnosis from Referral:   M25.561,M25.562 (ICD-10-CM) - Pain in both knees, unspecified chronicity   S86.899A (ICD-10-CM) - Posterior shin splints, initial encounter   R25.2 (ICD-10-CM) - Muscle cramping   Evaluation Date: 11/26/2024  Authorization Period Expiration: 12/31/2025  Plan of Care Expiration: 02/26/2025  Visit # / Visits authorized: 2/10     FOTO: 34  FOTO 1st Follow-up: NA  FOTO 2nd Follow-up: NA     Time In: 1800  Time Out: 1700  Total Billable Time: 60 minutes     Precautions: Standard and Hx of Stress B tibial crest    Subjective     Pt reports He is feeling better and has not had as much pain at work with walking. He only gets pain when wakign excessive amounts in a day which is over his usual 4-8 miles..   He was compliant with home exercise program.  Response to previous treatment: Good  Functional change: Improved walking tolerance    Pain: 3/10  Location: bilateral knee      Objective     Objective Measures updated at progress report unless specified    Treatment     PT Tech Extender utilized under supervision throughout tx session  Access Code: [email or web address]  Palomo received the treatments listed below:       Neuromuscular re-education activities to improve: Balance, Coordination, Kinesthetic, Sense, Proprioception, and Posture for 0 minutes. The following activities were included:   NP:  Bridging GTB 3 x 15  Fire Hydrants GTB 3 x 15  SL Clams GTB 3 x 15     Therapeutic activities to improve functional performance for 60 minutes, including:  Bike completed for 10' to increase ROM, endurance, and decrease pain to improve tolerance to ADLs and age-related activities  Monster Walks GTB x 10 laps  Squat to Bench Holds 30# Db  5x8  SL Kickstand RDL 30# Db 5x8  Shuttle SL 3 bands 4 x 10  Shuttle donkey kicks 4x10 2 bands     NP:  Standing Hip Diagonals GTB 3 x 15     Home Exercises and Patient Education Provided      Education provided:   - Hip mobility and stability  - Prognosis, activity modification, goals for therapy, role of therapy for care, exercises/HEP     Written Home Exercises Provided:  Exercises were reviewed and Palomo was able to demonstrate them prior to the end of the session.   Pt received a written copy of exercises to perform at home. Palomo demonstrated good understanding of the education provided.      See EMR under patient instructions for exercises given.     Assessment     Palomo returned for his first visit in a month. Pt reports he tolerated the load increase well last session with no increased reports of pain or swelling. Continued to increase load for patient which he was able to tolerate with no increases in pain. He reports having less pain with walking at work, and he only has pain when he walks too much in a day which is exceeding 8 miles. Discussed discharge planning, but pt wants to return for one more visit due to missing a month.     Palomo Is progressing well towards his goals.   Pt prognosis is Good.     Pt will continue to benefit from skilled outpatient physical therapy to address the deficits listed in the problem list box on initial evaluation, provide pt/family education and to maximize pt's level of independence in the home and community environment.     Pt's spiritual, cultural and educational needs considered and pt agreeable to plan of care and goals.    Anticipated barriers to physical therapy: work demands; Hx of stress fractures     GOALS   Short Term Goals: 6 weeks  Pt will report decreased B knee pain  < / =  4/10  to increase tolerance for ADL performance and recreational routine.  Pt will improve B knee ROM to WFL in order to be able to perform ADLs without difficulty.  Pt will improve B knee  strength by 1/3 MMT grade to increase tolerance for ADL and work activities  Pt will demonstrate tolerance to HEP to improve independence with ADL's     Long Term Goals: 12 weeks  Pt will report decreased B knee pain  < / =  2/10  to increase tolerance for ADL performance and recreational routine  Pt will improve B knee ROM to WNL in order to be able to perform ADLs without difficulty  Pt will improve B knee strength by 1/3 MMT grade to increase tolerance for ADL and work activities  Pt will report at CJ level (20-40% impaired) on FOTO Knee to demonstrate increase in LE function with every day tasks.     Plan     Continue per ANN Medina [employer], PT, DPT

## 2025-02-04 ENCOUNTER — CLINICAL SUPPORT (OUTPATIENT)
Dept: REHABILITATION | Facility: HOSPITAL | Age: 45
End: 2025-02-04
Attending: PHYSICAL THERAPIST
Payer: COMMERCIAL

## 2025-02-04 DIAGNOSIS — R26.9 GAIT ABNORMALITY: Primary | ICD-10-CM

## 2025-02-04 PROCEDURE — 97530 THERAPEUTIC ACTIVITIES: CPT | Performed by: PHYSICAL THERAPIST

## 2025-02-10 NOTE — PROGRESS NOTES
Physical Therapy Daily Treatment Note     Name: Joe Culver  Clinic Number: [id number]    Therapy Diagnosis:   Encounter Diagnosis   Name Primary?    Gait abnormality Yes     Physician: Luther Mcgee PA*    Visit Date: 2/4/2025    Physician Orders: PT Eval and Treat  Medical Diagnosis from Referral:   M25.561,M25.562 (ICD-10-CM) - Pain in both knees, unspecified chronicity   S86.899A (ICD-10-CM) - Posterior shin splints, initial encounter   R25.2 (ICD-10-CM) - Muscle cramping   Evaluation Date: 11/26/2024  Authorization Period Expiration: 12/31/2025  Plan of Care Expiration: 02/26/2025  Visit # / Visits authorized: 3/10     FOTO: 34  FOTO 1st Follow-up: NA  FOTO 2nd Follow-up: NA     Time In: 1600  Time Out: 1700  Total Billable Time: 60 minutes     Precautions: Standard and Hx of Stress B tibial crest    Subjective     Pt reports feeling much better; still pushing through pain at work, but less sx behavior at the end of the day.  He was compliant with home exercise program.  Response to previous treatment: Good  Functional change: Improved walking tolerance    Pain: 3/10  Location: bilateral knee      Objective     Objective Measures updated at progress report unless specified    Treatment     PT Tech Extender utilized under supervision throughout tx session  Access Code: [email or web address]  Palomo received the treatments listed below:       Neuromuscular re-education activities to improve: Balance, Coordination, Kinesthetic, Sense, Proprioception, and Posture for 0 minutes. The following activities were included:   NP:  Bridging GTB 3 x 15  Fire Hydrants GTB 3 x 15  SL Clams GTB 3 x 15     Therapeutic activities to improve functional performance for 60 minutes, including:  Bike completed for 10' to increase ROM, endurance, and decrease pain to improve tolerance to ADLs and age-related activities  Monster Walks GTB x 10 laps  Squat to Bench Holds 30# Db 5x8  SL Kickstand RDL 30# Db 5x8  Shuttle SL 3 bands 4 x 10  Shuttle  donkey kicks 4x10 2 bands     NP:  Standing Hip Diagonals GTB 3 x 15     Home Exercises and Patient Education Provided      Education provided:   - Hip mobility and stability  - Prognosis, activity modification, goals for therapy, role of therapy for care, exercises/HEP     Written Home Exercises Provided:  Exercises were reviewed and Palomo was able to demonstrate them prior to the end of the session.   Pt received a written copy of exercises to perform at home. Palomo demonstrated good understanding of the education provided.      See EMR under patient instructions for exercises given.     Assessment     Palomo reported less rate limiting pain towards the end of the day following work shifts. Also reports that he was able to modify his shift duration which has helped with his pain. Able to tolerate more circuit training during today's session with good proximal hip loading and no adverse B knee pain.    Palomo Is progressing well towards his goals.   Pt prognosis is Good.     Pt will continue to benefit from skilled outpatient physical therapy to address the deficits listed in the problem list box on initial evaluation, provide pt/family education and to maximize pt's level of independence in the home and community environment.     Pt's spiritual, cultural and educational needs considered and pt agreeable to plan of care and goals.    Anticipated barriers to physical therapy: work demands; Hx of stress fractures     GOALS   Short Term Goals: 6 weeks  Pt will report decreased B knee pain  < / =  4/10  to increase tolerance for ADL performance and recreational routine.  Pt will improve B knee ROM to WFL in order to be able to perform ADLs without difficulty.  Pt will improve B knee strength by 1/3 MMT grade to increase tolerance for ADL and work activities  Pt will demonstrate tolerance to HEP to improve independence with ADL's     Long Term Goals: 12 weeks  Pt will report decreased B knee pain  < / =  2/10  to increase  tolerance for ADL performance and recreational routine  Pt will improve B knee ROM to WNL in order to be able to perform ADLs without difficulty  Pt will improve B knee strength by 1/3 MMT grade to increase tolerance for ADL and work activities  Pt will report at CJ level (20-40% impaired) on FOTO Knee to demonstrate increase in LE function with every day tasks.     Plan     Continue per ANN Concepcion, PT, DPT

## 2025-02-25 ENCOUNTER — CLINICAL SUPPORT (OUTPATIENT)
Dept: REHABILITATION | Facility: HOSPITAL | Age: 45
End: 2025-02-25
Payer: COMMERCIAL

## 2025-02-25 DIAGNOSIS — R26.9 GAIT ABNORMALITY: Primary | ICD-10-CM

## 2025-02-25 PROCEDURE — 97530 THERAPEUTIC ACTIVITIES: CPT | Performed by: PHYSICAL THERAPIST

## 2025-02-25 NOTE — PROGRESS NOTES
Physical Therapy Daily Treatment Note     Name: Joe Culver  Clinic Number: [id number]    Therapy Diagnosis:   Encounter Diagnosis   Name Primary?    Gait abnormality Yes     Physician: Luther Mcgee PA*    Visit Date: 2/25/2025    Physician Orders: PT Eval and Treat  Medical Diagnosis from Referral:   M25.561,M25.562 (ICD-10-CM) - Pain in both knees, unspecified chronicity   S86.899A (ICD-10-CM) - Posterior shin splints, initial encounter   R25.2 (ICD-10-CM) - Muscle cramping   Evaluation Date: 11/26/2024  Authorization Period Expiration: 12/31/2025  Plan of Care Expiration: 02/26/2025  Visit # / Visits authorized: 4/10     FOTO: 34  FOTO 1st Follow-up: NA  FOTO 2nd Follow-up: NA     Time In: 1600  Time Out: 1700  Total Billable Time: 60 minutes     Precautions: Standard and Hx of Stress B tibial crest    Subjective     Pt reports B knees continue to feel stronger. Has accepted a new job in [place], so will be moving this week.  He was compliant with home exercise program.  Response to previous treatment: Good  Functional change: Improved walking tolerance    Pain: 1/10  Location: bilateral knee      Objective     Objective Measures updated at progress report unless specified    Treatment     PT Tech Extender utilized under supervision throughout tx session  Access Code: [email or web address]  Palomo received the treatments listed below:       Neuromuscular re-education activities to improve: Balance, Coordination, Kinesthetic, Sense, Proprioception, and Posture for 0 minutes. The following activities were included:   NP:  Bridging GTB 3 x 15  Fire Hydrants GTB 3 x 15  SL Clams GTB 3 x 15     Therapeutic activities to improve functional performance for 60 minutes, including:  Bike completed for 10' to increase ROM, endurance, and decrease pain to improve tolerance to ADLs and age-related activities  Monster Walks GTB x 10 laps  Squat to Bench Holds 30# Db 5x8  SL Kickstand RDL 30# Db 5x8  Shuttle SL 3 bands 4 x 10  Shuttle  donkey kicks 4x10 2 bands     NP:  Standing Hip Diagonals GTB 3 x 15     Home Exercises and Patient Education Provided      Education provided:   - Hip mobility and stability  - Prognosis, activity modification, goals for therapy, role of therapy for care, exercises/HEP     Written Home Exercises Provided:  Exercises were reviewed and Palomo was able to demonstrate them prior to the end of the session.   Pt received a written copy of exercises to perform at home. Palomo demonstrated good understanding of the education provided.      See EMR under patient instructions for exercises given.     Assessment     Palomo reported that he will be moving to [place], [place] this week, so will be transitioning to self-care moving forward. Updated all HEP for understanding; can RTC PRN in the future. Overall, subjective reports of improved overall knee pain; objective strength testing and SL functional control has significantly improved. OK for D/C.    Palomo Is progressing well towards his goals.   Pt prognosis is Good.     Pt will continue to benefit from skilled outpatient physical therapy to address the deficits listed in the problem list box on initial evaluation, provide pt/family education and to maximize pt's level of independence in the home and community environment.     Pt's spiritual, cultural and educational needs considered and pt agreeable to plan of care and goals.    Anticipated barriers to physical therapy: work demands; Hx of stress fractures     GOALS   Short Term Goals: 6 weeks  Pt will report decreased B knee pain  < / =  4/10  to increase tolerance for ADL performance and recreational routine.  Pt will improve B knee ROM to WFL in order to be able to perform ADLs without difficulty.  Pt will improve B knee strength by 1/3 MMT grade to increase tolerance for ADL and work activities  Pt will demonstrate tolerance to HEP to improve independence with ADL's     Long Term Goals: 12 weeks  Pt will report decreased B knee  pain  < / =  2/10  to increase tolerance for ADL performance and recreational routine  Pt will improve B knee ROM to WNL in order to be able to perform ADLs without difficulty  Pt will improve B knee strength by 1/3 MMT grade to increase tolerance for ADL and work activities  Pt will report at CJ level (20-40% impaired) on FOTO Knee to demonstrate increase in LE function with every day tasks.     Plan     D/C to self-care    Sharath Concepcion, PT, DPT